# Patient Record
Sex: FEMALE | Race: WHITE
[De-identification: names, ages, dates, MRNs, and addresses within clinical notes are randomized per-mention and may not be internally consistent; named-entity substitution may affect disease eponyms.]

---

## 2017-03-20 ENCOUNTER — HOSPITAL ENCOUNTER (OUTPATIENT)
Dept: HOSPITAL 80 - BRMIMAGING | Age: 42
End: 2017-03-20
Attending: GENERAL ACUTE CARE HOSPITAL
Payer: COMMERCIAL

## 2017-03-20 DIAGNOSIS — Z12.31: Primary | ICD-10-CM

## 2017-03-20 PROCEDURE — G0202 SCR MAMMO BI INCL CAD: HCPCS

## 2017-04-06 ENCOUNTER — HOSPITAL ENCOUNTER (OUTPATIENT)
Dept: HOSPITAL 80 - BRMIMAGING | Age: 42
End: 2017-04-06
Attending: GENERAL ACUTE CARE HOSPITAL
Payer: COMMERCIAL

## 2017-04-06 DIAGNOSIS — Z03.89: Primary | ICD-10-CM

## 2017-04-06 PROCEDURE — G0206 DX MAMMO INCL CAD UNI: HCPCS

## 2018-03-21 ENCOUNTER — HOSPITAL ENCOUNTER (OUTPATIENT)
Dept: HOSPITAL 80 - BRMIMAGING | Age: 43
End: 2018-03-21
Attending: OBSTETRICS & GYNECOLOGY
Payer: COMMERCIAL

## 2018-03-21 DIAGNOSIS — Z12.31: Primary | ICD-10-CM

## 2019-04-22 ENCOUNTER — HOSPITAL ENCOUNTER (OUTPATIENT)
Dept: HOSPITAL 80 - FSGY | Age: 44
Discharge: HOME | End: 2019-04-22
Payer: COMMERCIAL

## 2019-04-22 VITALS — DIASTOLIC BLOOD PRESSURE: 82 MMHG | SYSTOLIC BLOOD PRESSURE: 120 MMHG

## 2019-04-22 DIAGNOSIS — K31.7: Primary | ICD-10-CM

## 2019-04-22 DIAGNOSIS — Z87.891: ICD-10-CM

## 2019-04-22 DIAGNOSIS — K44.9: ICD-10-CM

## 2019-04-22 DIAGNOSIS — K21.9: ICD-10-CM

## 2019-04-22 PROCEDURE — 0DB68ZX EXCISION OF STOMACH, VIA NATURAL OR ARTIFICIAL OPENING ENDOSCOPIC, DIAGNOSTIC: ICD-10-PCS

## 2019-04-22 NOTE — PDANEPAE
ANE History of Present Illness





esophageal polyp s/f EGD





ANE Past Medical History





- Cardiovascular History


Hx Hypertension: No


Hx Arrhythmias: No


Hx Chest Pain: No


Cardiovascular History Comment: hx SVT,[place] heart.  no SVT for a very long 

time





- Pulmonary History


Hx COPD: No


Hx Asthma/Reactive Airway Disease: Yes


Hx Recent Upper Respiratory Infection: No


Hx Oxygen in Use at Home: No


Hx Sleep Apnea: No


Sleep Apnea Screening Result - Last Documented: Negative


Pulmonary History Comment: severe allergies.  allergies trigger asthma





- Neurologic History


Hx Cerebrovascular Accident: No


Hx Seizures: No


Hx Dementia: No





- Endocrine History


Hx Diabetes: No





- Renal History


Hx Renal Disorders: No





- Liver History


Hx Hepatic Disorders: No





- Neurological & Psychiatric Hx


Hx Neurological and Psychiatric Disorders: No





- Cancer History


Hx Cancer: No





- Congenital Disorder History


Hx Congenital Disorders: No





- GI History


Hx Gastrointestinal Disorders: Yes


Gastrointestinal History Comment: severe acid reflux removed very large polyp





- Other Health History


Other Health History: voice/hoarseness r/t gerd





- Chronic Pain History


Chronic Pain: No





- Surgical History


Prior Surgeries: EGD 2017





ANE Review of Systems


Review of Systems: 








- Exercise capacity


METS (RN): 6 METS





ANE Patient History





- Allergies


Allergies/Adverse Reactions: 








iodine [Iodine] Allergy (Severe, Verified 04/16/19 14:42)


 Anaphylaxis


Penicillins Allergy (Severe, Verified 04/16/19 14:42)


 


Shellfish *RETIRED-09/10/12 [Shellfish] Allergy (Severe, Verified 04/16/19 14:42

)


 Rash


latex [Latex] Allergy (Intermediate, Verified 04/16/19 14:42)


 Rash


lorazepam [From Ativan] Allergy (Intermediate, Verified 04/16/19 14:42)


 AGITATION


promethazine HCl [From Phenergan] Allergy (Intermediate, Verified 04/16/19 14:42

)


 AGITATION


aspirin [Aspirin] Allergy (Unknown, Verified 04/16/19 14:42)


 








- Home Medications


Home medications: home medication list seen and reviewed


Home Medications: 








ESOMEPRAZOLE MAG TRIHYDRATE [NEXIUM]  01/14/13 [Last Taken Unknown]


Proair Hfa  04/16/19 [Last Taken Unknown]








- NPO status


NPO Status: no food or drink >8 hours


NPO Since - Liquids (Date): 04/21/19


NPO Since - Liquids (Time): 23:55


NPO Since - Solids (Date): 04/21/19


NPO Since - Solids (Time): 23:55





- Anes Hx


Anes Hx: no prior problems





- Smoking Hx


Smoking Status: Former smoker





- Alcohol Use


Alcohol Use: None





- Family Anes Hx


Family Anes Hx: none


Family Hx Anesthesia Complications: grandmother respiratory issues





ANE Labs/Vital Signs





- Vital Signs


Blood Pressure: 116/81


Heart Rate: 86


Respiratory Rate: 19


O2 Sat (%): 99


Height: 165.1 cm


Weight: 70.307 kg





ANE Physical Exam





- Airway


Neck exam: FROM


Mallampati Score: Class 1


Mouth exam: normal dental/mouth exam





- Pulmonary


Pulmonary: no respiratory distress





- Cardiovascular


Cardiovascular: regular rate and rhythym





- ASA Status


ASA Status: II





ANE Anesthesia Plan


Anesthesia Plan: GA with mask


Total IV Anesthesia: Yes

## 2019-04-22 NOTE — GIREPORT
[facility]

Surgical Services - Endoscopy Department

_____________________________________________________________________________________________________
_______

Patient Name: Celeste Johnston                       Procedure Date: 4/22/2019 1:39 PM

MRN: [id number]                                       Account Number: [id number]

Patient Type: Outpatient                             Attending  MD/ ER Physician: Evangelista Ramos MD

_____________________________________________________________________________________________________
_______

 

Procedure:                    Upper GI endoscopy

Indications:                  Heartburn, Follow-up of gastric polyps

Patient Profile:              43 year old female presents for follow up on gastric polyps/heartburn.

Providers:                    Evangelista Ramos MD

Medicines:                    Monitored Anesthesia Care

Complications:                No immediate complications. Estimated blood loss: Minimal.

Description of Procedure:     After obtaining informed consent, the endoscope was passed under direct
 

                              vision. Throughout the procedure, the patient's blood pressure, pulse, 
and 

                              oxygen saturations were monitored continuously. The Endoscope was intro
duced 

                              through the mouth, and advanced to the second part of duodenum. The [facility]
er GI 

                              endoscopy was accomplished without difficulty. The patient tolerated th
e 

                              procedure well.

Findings:                     The examined esophagus was normal.

                              A hiatal hernia was present.

                              Multiple medium sessile polyps were found on the greater curvature of t
he 

                              stomach. The polyp was removed with a hot snare. Resection and retrieva
l 

                              were complete.

                              Patchy mildly erythematous mucosa was found in the gastric body and in 
the 

                              gastric antrum. Biopsies were taken with a cold forceps for histology.

                              The examined duodenum was normal.

Estimated Blood Loss:         Estimated blood loss was minimal.

Post Op Diagnosis:            - Normal esophagus.

                              - Hiatal hernia.

                              - Multiple gastric polyps. Resected and retrieved.

                              - Erythematous mucosa in the gastric body and antrum. Biopsied.

                              - Normal examined duodenum.

Recommendation:               - Discharge patient to home (with escort).

                              - The signs and symptoms of potential delayed complications were discus
sed 

                              with the patient.

                              - Patient has a contact number available for emergencies.

                              - Return to normal activities tomorrow.

                              - Resume previous diet.

                              - Continue present medications.

                              - Await pathology results.

                              - Thank you for allowing me to participate in the care of your patient.


Attending Participation:

     I personally performed the entire procedure.

 

Evangelista Ramos MD

_____________

Evangelista Ramos MD

4/22/2019 2:23:52 PM

This report has been signed electronicallyEvangelista Ramos MD

Number of Addenda: 0

 

Note Initiated On: 4/22/2019 1:39 PM

[email or web address]

## 2019-04-22 NOTE — PDGENHP
History & Physical


Chief Complaint: gastric polyp


History of Present Illness: 43 year presents for surveillance of a complex 

gastric polyp


Pertinent Past, Social, Family History: PMHx: asthma, SVT


Relevant Physical Exam: HEENT: anicteric.  CV: RRR +s1s2.  Lungs: CTAB.  Abd: 

soft, nt, + bs


Cardiorespiratory Assessment: ASA 2

## 2019-04-30 ENCOUNTER — HOSPITAL ENCOUNTER (OUTPATIENT)
Dept: HOSPITAL 80 - FIMAGING | Age: 44
End: 2019-04-30
Attending: OBSTETRICS & GYNECOLOGY
Payer: COMMERCIAL

## 2019-04-30 DIAGNOSIS — Z12.31: Primary | ICD-10-CM
